# Patient Record
Sex: MALE | Race: OTHER
[De-identification: names, ages, dates, MRNs, and addresses within clinical notes are randomized per-mention and may not be internally consistent; named-entity substitution may affect disease eponyms.]

---

## 2020-02-14 ENCOUNTER — HOSPITAL ENCOUNTER (EMERGENCY)
Dept: HOSPITAL 63 - ER | Age: 25
LOS: 2 days | Discharge: HOME | End: 2020-02-16
Payer: SELF-PAY

## 2020-02-14 VITALS — HEIGHT: 74 IN | WEIGHT: 280.21 LBS | BODY MASS INDEX: 35.96 KG/M2

## 2020-02-14 DIAGNOSIS — F15.10: ICD-10-CM

## 2020-02-14 DIAGNOSIS — F12.10: ICD-10-CM

## 2020-02-14 DIAGNOSIS — R44.1: Primary | ICD-10-CM

## 2020-02-14 LAB
ALBUMIN SERPL-MCNC: 4.1 G/DL (ref 3.4–5)
ALBUMIN/GLOB SERPL: 1.1 {RATIO} (ref 1–1.7)
ALP SERPL-CCNC: 101 U/L (ref 46–116)
ALT SERPL-CCNC: 24 U/L (ref 16–63)
AMPHETAMINE/METHAMPHETAMINE: (no result)
ANION GAP SERPL CALC-SCNC: 10 MMOL/L (ref 6–14)
APTT PPP: YELLOW S
AST SERPL-CCNC: 46 U/L (ref 15–37)
BACTERIA #/AREA URNS HPF: 0 /HPF
BARBITURATES UR-MCNC: (no result) UG/ML
BASOPHILS # BLD AUTO: 0.1 X10^3/UL (ref 0–0.2)
BASOPHILS NFR BLD: 1 % (ref 0–3)
BENZODIAZ UR-MCNC: (no result) UG/L
BILIRUB SERPL-MCNC: 0.8 MG/DL (ref 0.2–1)
BILIRUB UR QL STRIP: (no result)
BUN/CREAT SERPL: 12 (ref 6–20)
CA-I SERPL ISE-MCNC: 15 MG/DL (ref 8–26)
CALCIUM SERPL-MCNC: 8.9 MG/DL (ref 8.5–10.1)
CANNABINOIDS UR-MCNC: (no result) UG/L
CHLORIDE SERPL-SCNC: 100 MMOL/L (ref 98–107)
CO2 SERPL-SCNC: 29 MMOL/L (ref 21–32)
COCAINE UR-MCNC: (no result) NG/ML
CREAT SERPL-MCNC: 1.3 MG/DL (ref 0.7–1.3)
EOSINOPHIL NFR BLD: 0.2 X10^3/UL (ref 0–0.7)
EOSINOPHIL NFR BLD: 2 % (ref 0–3)
ERYTHROCYTE [DISTWIDTH] IN BLOOD BY AUTOMATED COUNT: 14.4 % (ref 11.5–14.5)
FIBRINOGEN PPP-MCNC: CLEAR MG/DL
GFR SERPLBLD BASED ON 1.73 SQ M-ARVRAT: 69 ML/MIN
GLOBULIN SER-MCNC: 3.7 G/DL (ref 2.2–3.8)
GLUCOSE SERPL-MCNC: 100 MG/DL (ref 70–99)
GLUCOSE UR STRIP-MCNC: (no result) MG/DL
HCT VFR BLD CALC: 45.7 % (ref 39–53)
HGB BLD-MCNC: 15.4 G/DL (ref 13–17.5)
HYALINE CASTS #/AREA URNS LPF: (no result) /HPF
LYMPHOCYTES # BLD: 1.9 X10^3/UL (ref 1–4.8)
LYMPHOCYTES NFR BLD AUTO: 16 % (ref 24–48)
MCH RBC QN AUTO: 31 PG (ref 25–35)
MCHC RBC AUTO-ENTMCNC: 34 G/DL (ref 31–37)
MCV RBC AUTO: 93 FL (ref 79–100)
METHADONE SERPL-MCNC: (no result) NG/ML
MONO #: 1 X10^3/UL (ref 0–1.1)
MONOCYTES NFR BLD: 8 % (ref 0–9)
NEUT #: 9 X10^3UL (ref 1.8–7.7)
NEUTROPHILS NFR BLD AUTO: 74 % (ref 31–73)
NITRITE UR QL STRIP: (no result)
OPIATES UR-MCNC: (no result) NG/ML
PCP SERPL-MCNC: (no result) MG/DL
PLATELET # BLD AUTO: 281 X10^3/UL (ref 140–400)
POTASSIUM SERPL-SCNC: 4.2 MMOL/L (ref 3.5–5.1)
PROT SERPL-MCNC: 7.8 G/DL (ref 6.4–8.2)
RBC # BLD AUTO: 4.92 X10^6/UL (ref 4.3–5.7)
RBC #/AREA URNS HPF: (no result) /HPF (ref 0–2)
SODIUM SERPL-SCNC: 139 MMOL/L (ref 136–145)
SP GR UR STRIP: 1.02
SQUAMOUS #/AREA URNS LPF: (no result) /LPF
UROBILINOGEN UR-MCNC: 0.2 MG/DL
WBC # BLD AUTO: 12.2 X10^3/UL (ref 4–11)
WBC #/AREA URNS HPF: (no result) /HPF (ref 0–4)

## 2020-02-14 PROCEDURE — 85025 COMPLETE CBC W/AUTO DIFF WBC: CPT

## 2020-02-14 PROCEDURE — 71046 X-RAY EXAM CHEST 2 VIEWS: CPT

## 2020-02-14 PROCEDURE — 80053 COMPREHEN METABOLIC PANEL: CPT

## 2020-02-14 PROCEDURE — 36415 COLL VENOUS BLD VENIPUNCTURE: CPT

## 2020-02-14 PROCEDURE — 87804 INFLUENZA ASSAY W/OPTIC: CPT

## 2020-02-14 PROCEDURE — 81001 URINALYSIS AUTO W/SCOPE: CPT

## 2020-02-14 PROCEDURE — 99285 EMERGENCY DEPT VISIT HI MDM: CPT

## 2020-02-14 PROCEDURE — 80307 DRUG TEST PRSMV CHEM ANLYZR: CPT

## 2020-02-14 NOTE — PHYS DOC
Adult General


Chief Complaint


Chief Complaint:  PSYCH EVALUATION





HPI


HPI


24-year-old male [facility] with concern of 

hallucinations. The patient has been having visual hallucinations lately. He 

denies suicidal or homicidal ideation. He is not taking his antipsychotic 

medications. The patient admits to intermittent methamphetamine use for the last

4 years. He states that he only has suicidal or homicidal thoughts when he uses 

drugs. He does not have those thoughts right now. He has had a cough for a 

couple days and is concerned about bronchitis or pneumonia. He does not believe 

he is had a fever.


 (SABRINA CHAN DO)





Review of Systems


Review of Systems





Constitutional: Denies fever or chills []


Eyes: Denies change in visual acuity, redness, or eye pain []


HENT: Denies nasal congestion or sore throat []


Respiratory: Cough without shortness of breath []


Cardiovascular: No additional information not addressed in HPI []


GI: Denies abdominal pain, nausea, vomiting, bloody stools or diarrhea []


: Denies dysuria or hematuria []


Musculoskeletal: Denies back pain or joint pain []


Integument: Denies rash or skin lesions []


Neurologic: Visual hallucinations. Denies headache, focal weakness or sensory 

changes []


Endocrine: Denies polyuria or polydipsia []





All other systems were reviewed and found to be within normal limits, except as 

documented in this note.


 (SABRINA CHAN DO)





Physical Exam


Physical Exam





Constitutional: Well developed, well nourished, no acute distress, non-toxic 

appearance. []


HENT: Normocephalic, atraumatic, bilateral external ears normal, oropharynx 

moist, no oral exudates, nose normal. []


Eyes: PERRLA, EOMI, conjunctiva normal, no discharge. [] 


Neck: Normal range of motion, no tenderness, supple, no stridor. [] 


Cardiovascular:Heart rate regular rhythm, no murmur []


Lungs & Thorax:  Bilateral breath sounds clear to auscultation []


Abdomen: Bowel sounds normal, soft, no tenderness, no masses, no pulsatile 

masses. [] 


Skin: Warm, dry, no erythema, no rash. [] 


Back: No tenderness, no CVA tenderness. [] 


Extremities: No tenderness, no cyanosis, no clubbing, ROM intact, no edema. [] 


Neurologic: Alert and oriented X 3, normal motor function, normal sensory 

function, no focal deficits noted. []


Psychologic: Affect flat, mood normal. Occasionally talking to himself or an 

unseen person in the room.[]


 (SABRINA CHAN DO)





EKG


EKG


[]


 (SABRINA CHAN DO)





Radiology/Procedures


Radiology/Procedures


[]


 (SABRINA CHAN DO)





Course & Med Decision Making


Course & Med Decision Making


Pertinent Labs and Imaging studies reviewed. (See chart for details)


The patient's labs are unremarkable. His urinalysis is negative for infection. 

His urine drug screen is positive for marijuana and amphetamines. Psychiatric 

screening is done prior to sending the patient to the emergency room. The plan 

is for involuntary admission. Placement is pending.


We have documentation from the counseling office that the patient meets criteria

 for involuntary hold. It appears to be appropriate legal paperwork, but we are 

working on that to verify. The patient does not want to stay, because he does 

not want to go to [facility].


I'm signing the patient out to Dr. Joshi at 1800. He will continue to manage 

the patient as he waits for placement.


[]


 (SABRINA CHAN DO)


Course & Med Decision Making


Assumed care at shift change 1800hrs-


Patient disposition pending placement.


Patient observed.


Patient calm and cooperative.





0530hrs


Patient pending placement.


No issure with patient overnight.


Patient has had non productive cough.








)600hrs


Patient signed out to Dr Chan Pending placement.


 (TAMMY JOSHI DO)


Dragon Disclaimer


Dragon Disclaimer


This electronic medical record was generated, in whole or in part, using a voice

 recognition dictation system.


 (SABRINA CHAN DO)





Departure


Departure:


Impression:  


   Primary Impression:  


   Hallucination, visual


   Additional Impressions:  


   Amphetamine abuse


   Marijuana use


Disposition:  65 XFER TO PSYCH HOSP/UNIT


Condition:  STABLE





Problem Qualifiers











SABRINA CHAN DO                 Feb 14, 2020 14:20


TAMMY JOSHI DO            Feb 14, 2020 19:51

## 2020-02-16 VITALS — DIASTOLIC BLOOD PRESSURE: 86 MMHG | SYSTOLIC BLOOD PRESSURE: 146 MMHG

## 2020-02-16 LAB
INFLUENZA A PATIENT: NEGATIVE
INFLUENZA B PATIENT: NEGATIVE

## 2020-02-16 NOTE — RAD
Study: CHEST PA   LATERAL

 

Indication: Cough.

 

Comparison: None.

 

Findings:

 

No confluent infiltrate. No pleural effusion or pneumothorax. Unremarkable

cardiomediastinal silhouette and michael. Mild thickening of scattered 

central and more peripheral bronchi.

 

Impression:

 

No confluent infiltrate to suggest an organizing pneumonia. Scattered 

bronchial walls appear mildly thickened which is nonspecific but could 

represent a manifestation of bronchitis.

 

Electronically signed by: LISA CARRASQUILLO MD (2/16/2020 8:33 AM) [facility]